# Patient Record
Sex: FEMALE | Race: BLACK OR AFRICAN AMERICAN | Employment: FULL TIME | URBAN - METROPOLITAN AREA
[De-identification: names, ages, dates, MRNs, and addresses within clinical notes are randomized per-mention and may not be internally consistent; named-entity substitution may affect disease eponyms.]

---

## 2017-01-18 PROCEDURE — 88175 CYTOPATH C/V AUTO FLUID REDO: CPT | Performed by: INTERNAL MEDICINE

## 2018-03-28 PROBLEM — E55.9 VITAMIN D DEFICIENCY: Status: ACTIVE | Noted: 2018-03-28

## 2018-03-28 PROBLEM — E78.2 MIXED HYPERLIPIDEMIA: Status: ACTIVE | Noted: 2018-03-28

## 2022-02-05 PROBLEM — D25.0 SUBMUCOUS LEIOMYOMA OF UTERUS: Status: ACTIVE | Noted: 2022-02-05

## 2024-04-22 LAB
HEPATITIS B SURFACE ANTIGEN OB: NEGATIVE
HIV RESULT OB: NEGATIVE
RH FACTOR OB: POSITIVE

## 2024-09-06 LAB — HIV RESULT OB: NEGATIVE

## 2024-10-15 LAB — STREP GP B CULT OB: NEGATIVE

## 2024-10-31 ENCOUNTER — ORDERS FOR ADMISSION (OUTPATIENT)
Dept: OBGYN UNIT | Facility: HOSPITAL | Age: 39
End: 2024-10-31

## 2024-10-31 ENCOUNTER — TELEPHONE (OUTPATIENT)
Dept: OBGYN UNIT | Facility: HOSPITAL | Age: 39
End: 2024-10-31

## 2024-10-31 VITALS — HEIGHT: 66 IN | BODY MASS INDEX: 47.09 KG/M2 | WEIGHT: 293 LBS

## 2024-10-31 RX ORDER — ACETAMINOPHEN 500 MG
500 TABLET ORAL EVERY 6 HOURS PRN
Status: CANCELLED | OUTPATIENT
Start: 2024-10-31

## 2024-10-31 RX ORDER — ACETAMINOPHEN 500 MG
1000 TABLET ORAL EVERY 6 HOURS PRN
Status: CANCELLED | OUTPATIENT
Start: 2024-10-31

## 2024-10-31 RX ORDER — SODIUM CHLORIDE, SODIUM LACTATE, POTASSIUM CHLORIDE, CALCIUM CHLORIDE 600; 310; 30; 20 MG/100ML; MG/100ML; MG/100ML; MG/100ML
INJECTION, SOLUTION INTRAVENOUS AS NEEDED
Status: CANCELLED | OUTPATIENT
Start: 2024-10-31

## 2024-10-31 RX ORDER — ONDANSETRON 2 MG/ML
4 INJECTION INTRAMUSCULAR; INTRAVENOUS EVERY 6 HOURS PRN
Status: CANCELLED | OUTPATIENT
Start: 2024-10-31

## 2024-10-31 RX ORDER — CITRIC ACID/SODIUM CITRATE 334-500MG
30 SOLUTION, ORAL ORAL AS NEEDED
Status: CANCELLED | OUTPATIENT
Start: 2024-10-31

## 2024-10-31 RX ORDER — TERBUTALINE SULFATE 1 MG/ML
0.25 INJECTION, SOLUTION SUBCUTANEOUS AS NEEDED
Status: CANCELLED | OUTPATIENT
Start: 2024-10-31

## 2024-10-31 RX ORDER — NIFEDIPINE 90 MG/1
90 TABLET, FILM COATED, EXTENDED RELEASE ORAL DAILY
COMMUNITY

## 2024-10-31 RX ORDER — ASPIRIN 81 MG/1
81 TABLET, CHEWABLE ORAL DAILY
COMMUNITY
End: 2024-11-02

## 2024-10-31 RX ORDER — CHOLECALCIFEROL (VITAMIN D3) 25 MCG
1 TABLET,CHEWABLE ORAL DAILY
COMMUNITY

## 2024-10-31 RX ORDER — LIDOCAINE HYDROCHLORIDE 10 MG/ML
30 INJECTION, SOLUTION EPIDURAL; INFILTRATION; INTRACAUDAL; PERINEURAL ONCE
Status: CANCELLED | OUTPATIENT
Start: 2024-10-31 | End: 2024-10-31

## 2024-10-31 RX ORDER — LABETALOL 200 MG/1
200 TABLET, FILM COATED ORAL 2 TIMES DAILY
COMMUNITY

## 2024-10-31 RX ORDER — IBUPROFEN 600 MG/1
600 TABLET, FILM COATED ORAL ONCE AS NEEDED
Status: CANCELLED | OUTPATIENT
Start: 2024-10-31

## 2024-10-31 RX ORDER — DEXTROSE, SODIUM CHLORIDE, SODIUM LACTATE, POTASSIUM CHLORIDE, AND CALCIUM CHLORIDE 5; .6; .31; .03; .02 G/100ML; G/100ML; G/100ML; G/100ML; G/100ML
INJECTION, SOLUTION INTRAVENOUS CONTINUOUS
Status: CANCELLED | OUTPATIENT
Start: 2024-10-31

## 2024-11-01 ENCOUNTER — HOSPITAL ENCOUNTER (INPATIENT)
Facility: HOSPITAL | Age: 39
LOS: 1 days | Discharge: HOME | End: 2024-11-02
Attending: OBSTETRICS & GYNECOLOGY | Admitting: OBSTETRICS & GYNECOLOGY
Payer: COMMERCIAL

## 2024-11-01 ENCOUNTER — ANESTHESIA EVENT (OUTPATIENT)
Dept: OBGYN UNIT | Facility: HOSPITAL | Age: 39
End: 2024-11-01
Payer: COMMERCIAL

## 2024-11-01 ENCOUNTER — ANESTHESIA (OUTPATIENT)
Dept: OBGYN UNIT | Facility: HOSPITAL | Age: 39
End: 2024-11-01
Payer: COMMERCIAL

## 2024-11-01 ENCOUNTER — APPOINTMENT (OUTPATIENT)
Dept: OBGYN CLINIC | Facility: HOSPITAL | Age: 39
End: 2024-11-01
Attending: OBSTETRICS & GYNECOLOGY
Payer: COMMERCIAL

## 2024-11-01 PROBLEM — Z34.90 PREGNANCY (HCC): Status: ACTIVE | Noted: 2024-11-01

## 2024-11-01 LAB
ANTIBODY SCREEN: NEGATIVE
BASOPHILS # BLD AUTO: 0.03 X10(3) UL (ref 0–0.2)
BASOPHILS NFR BLD AUTO: 0.3 %
DEPRECATED RDW RBC AUTO: 44.3 FL (ref 35.1–46.3)
EOSINOPHIL # BLD AUTO: 0.06 X10(3) UL (ref 0–0.7)
EOSINOPHIL NFR BLD AUTO: 0.6 %
ERYTHROCYTE [DISTWIDTH] IN BLOOD BY AUTOMATED COUNT: 15.7 % (ref 11–15)
HCT VFR BLD AUTO: 35.1 %
HGB BLD-MCNC: 11.9 G/DL
IMM GRANULOCYTES # BLD AUTO: 0.05 X10(3) UL (ref 0–1)
IMM GRANULOCYTES NFR BLD: 0.5 %
LYMPHOCYTES # BLD AUTO: 2.98 X10(3) UL (ref 1–4)
LYMPHOCYTES NFR BLD AUTO: 28.5 %
MCH RBC QN AUTO: 26.7 PG (ref 26–34)
MCHC RBC AUTO-ENTMCNC: 33.9 G/DL (ref 31–37)
MCV RBC AUTO: 78.9 FL
MONOCYTES # BLD AUTO: 1.26 X10(3) UL (ref 0.1–1)
MONOCYTES NFR BLD AUTO: 12.1 %
NEUTROPHILS # BLD AUTO: 6.06 X10 (3) UL (ref 1.5–7.7)
NEUTROPHILS # BLD AUTO: 6.06 X10(3) UL (ref 1.5–7.7)
NEUTROPHILS NFR BLD AUTO: 58 %
PLATELET # BLD AUTO: 233 10(3)UL (ref 150–450)
RBC # BLD AUTO: 4.45 X10(6)UL
RH BLOOD TYPE: POSITIVE
RH BLOOD TYPE: POSITIVE
T PALLIDUM AB SER QL IA: NONREACTIVE
WBC # BLD AUTO: 10.4 X10(3) UL (ref 4–11)

## 2024-11-01 PROCEDURE — 86780 TREPONEMA PALLIDUM: CPT | Performed by: OBSTETRICS & GYNECOLOGY

## 2024-11-01 PROCEDURE — 0UJD7ZZ INSPECTION OF UTERUS AND CERVIX, VIA NATURAL OR ARTIFICIAL OPENING: ICD-10-PCS | Performed by: OBSTETRICS & GYNECOLOGY

## 2024-11-01 PROCEDURE — 10907ZC DRAINAGE OF AMNIOTIC FLUID, THERAPEUTIC FROM PRODUCTS OF CONCEPTION, VIA NATURAL OR ARTIFICIAL OPENING: ICD-10-PCS | Performed by: OBSTETRICS & GYNECOLOGY

## 2024-11-01 PROCEDURE — 86850 RBC ANTIBODY SCREEN: CPT | Performed by: OBSTETRICS & GYNECOLOGY

## 2024-11-01 PROCEDURE — 3E033VJ INTRODUCTION OF OTHER HORMONE INTO PERIPHERAL VEIN, PERCUTANEOUS APPROACH: ICD-10-PCS | Performed by: OBSTETRICS & GYNECOLOGY

## 2024-11-01 PROCEDURE — 85025 COMPLETE CBC W/AUTO DIFF WBC: CPT | Performed by: OBSTETRICS & GYNECOLOGY

## 2024-11-01 PROCEDURE — 86901 BLOOD TYPING SEROLOGIC RH(D): CPT | Performed by: OBSTETRICS & GYNECOLOGY

## 2024-11-01 PROCEDURE — 86900 BLOOD TYPING SEROLOGIC ABO: CPT | Performed by: OBSTETRICS & GYNECOLOGY

## 2024-11-01 RX ORDER — TERBUTALINE SULFATE 1 MG/ML
0.25 INJECTION, SOLUTION SUBCUTANEOUS AS NEEDED
Status: DISCONTINUED | OUTPATIENT
Start: 2024-11-01 | End: 2024-11-01 | Stop reason: HOSPADM

## 2024-11-01 RX ORDER — BUPIVACAINE HYDROCHLORIDE 2.5 MG/ML
INJECTION, SOLUTION EPIDURAL; INFILTRATION; INTRACAUDAL
Status: COMPLETED | OUTPATIENT
Start: 2024-11-01 | End: 2024-11-01

## 2024-11-01 RX ORDER — ACETAMINOPHEN 500 MG
500 TABLET ORAL EVERY 6 HOURS PRN
Status: DISCONTINUED | OUTPATIENT
Start: 2024-11-01 | End: 2024-11-01 | Stop reason: HOSPADM

## 2024-11-01 RX ORDER — NIFEDIPINE 30 MG/1
90 TABLET, EXTENDED RELEASE ORAL DAILY
Status: DISCONTINUED | OUTPATIENT
Start: 2024-11-01 | End: 2024-11-01

## 2024-11-01 RX ORDER — NALBUPHINE HYDROCHLORIDE 10 MG/ML
2.5 INJECTION INTRAMUSCULAR; INTRAVENOUS; SUBCUTANEOUS
Status: DISCONTINUED | OUTPATIENT
Start: 2024-11-01 | End: 2024-11-01

## 2024-11-01 RX ORDER — LABETALOL 200 MG/1
200 TABLET, FILM COATED ORAL 2 TIMES DAILY
Status: DISCONTINUED | OUTPATIENT
Start: 2024-11-02 | End: 2024-11-02

## 2024-11-01 RX ORDER — BUPIVACAINE HYDROCHLORIDE 2.5 MG/ML
INJECTION, SOLUTION EPIDURAL; INFILTRATION; INTRACAUDAL
Status: COMPLETED
Start: 2024-11-01 | End: 2024-11-01

## 2024-11-01 RX ORDER — AMMONIA INHALANTS 0.04 G/.3ML
0.3 INHALANT RESPIRATORY (INHALATION) AS NEEDED
Status: DISCONTINUED | OUTPATIENT
Start: 2024-11-01 | End: 2024-11-02

## 2024-11-01 RX ORDER — ACETAMINOPHEN 500 MG
500 TABLET ORAL EVERY 6 HOURS PRN
Status: DISCONTINUED | OUTPATIENT
Start: 2024-11-01 | End: 2024-11-02

## 2024-11-01 RX ORDER — LIDOCAINE HYDROCHLORIDE 10 MG/ML
INJECTION, SOLUTION INFILTRATION; PERINEURAL
Status: COMPLETED | OUTPATIENT
Start: 2024-11-01 | End: 2024-11-01

## 2024-11-01 RX ORDER — NIFEDIPINE 30 MG/1
90 TABLET, EXTENDED RELEASE ORAL DAILY
Status: DISCONTINUED | OUTPATIENT
Start: 2024-11-02 | End: 2024-11-02

## 2024-11-01 RX ORDER — ACETAMINOPHEN 500 MG
1000 TABLET ORAL EVERY 6 HOURS PRN
Status: DISCONTINUED | OUTPATIENT
Start: 2024-11-01 | End: 2024-11-01 | Stop reason: HOSPADM

## 2024-11-01 RX ORDER — LIDOCAINE HYDROCHLORIDE AND EPINEPHRINE 15; 5 MG/ML; UG/ML
INJECTION, SOLUTION EPIDURAL
Status: COMPLETED | OUTPATIENT
Start: 2024-11-01 | End: 2024-11-01

## 2024-11-01 RX ORDER — DEXTROSE, SODIUM CHLORIDE, SODIUM LACTATE, POTASSIUM CHLORIDE, AND CALCIUM CHLORIDE 5; .6; .31; .03; .02 G/100ML; G/100ML; G/100ML; G/100ML; G/100ML
INJECTION, SOLUTION INTRAVENOUS CONTINUOUS
Status: DISCONTINUED | OUTPATIENT
Start: 2024-11-01 | End: 2024-11-01 | Stop reason: HOSPADM

## 2024-11-01 RX ORDER — ACETAMINOPHEN 500 MG
1000 TABLET ORAL EVERY 6 HOURS PRN
Status: DISCONTINUED | OUTPATIENT
Start: 2024-11-01 | End: 2024-11-02

## 2024-11-01 RX ORDER — LIDOCAINE HYDROCHLORIDE 10 MG/ML
30 INJECTION, SOLUTION EPIDURAL; INFILTRATION; INTRACAUDAL; PERINEURAL ONCE
Status: DISCONTINUED | OUTPATIENT
Start: 2024-11-01 | End: 2024-11-01 | Stop reason: HOSPADM

## 2024-11-01 RX ORDER — BUPIVACAINE HCL/0.9 % NACL/PF 0.25 %
5 PLASTIC BAG, INJECTION (ML) EPIDURAL AS NEEDED
Status: DISCONTINUED | OUTPATIENT
Start: 2024-11-01 | End: 2024-11-01

## 2024-11-01 RX ORDER — IBUPROFEN 600 MG/1
600 TABLET, FILM COATED ORAL EVERY 6 HOURS
Status: DISCONTINUED | OUTPATIENT
Start: 2024-11-01 | End: 2024-11-02

## 2024-11-01 RX ORDER — CITRIC ACID/SODIUM CITRATE 334-500MG
30 SOLUTION, ORAL ORAL AS NEEDED
Status: DISCONTINUED | OUTPATIENT
Start: 2024-11-01 | End: 2024-11-01 | Stop reason: HOSPADM

## 2024-11-01 RX ORDER — DOCUSATE SODIUM 100 MG/1
100 CAPSULE, LIQUID FILLED ORAL
Status: DISCONTINUED | OUTPATIENT
Start: 2024-11-01 | End: 2024-11-02

## 2024-11-01 RX ORDER — ONDANSETRON 2 MG/ML
4 INJECTION INTRAMUSCULAR; INTRAVENOUS EVERY 6 HOURS PRN
Status: DISCONTINUED | OUTPATIENT
Start: 2024-11-01 | End: 2024-11-01 | Stop reason: HOSPADM

## 2024-11-01 RX ORDER — IBUPROFEN 600 MG/1
600 TABLET, FILM COATED ORAL ONCE AS NEEDED
Status: DISCONTINUED | OUTPATIENT
Start: 2024-11-01 | End: 2024-11-01 | Stop reason: HOSPADM

## 2024-11-01 RX ORDER — BISACODYL 10 MG
10 SUPPOSITORY, RECTAL RECTAL ONCE AS NEEDED
Status: DISCONTINUED | OUTPATIENT
Start: 2024-11-01 | End: 2024-11-02

## 2024-11-01 RX ORDER — SODIUM CHLORIDE, SODIUM LACTATE, POTASSIUM CHLORIDE, CALCIUM CHLORIDE 600; 310; 30; 20 MG/100ML; MG/100ML; MG/100ML; MG/100ML
INJECTION, SOLUTION INTRAVENOUS AS NEEDED
Status: DISCONTINUED | OUTPATIENT
Start: 2024-11-01 | End: 2024-11-01 | Stop reason: HOSPADM

## 2024-11-01 RX ORDER — SIMETHICONE 80 MG
80 TABLET,CHEWABLE ORAL 3 TIMES DAILY PRN
Status: DISCONTINUED | OUTPATIENT
Start: 2024-11-01 | End: 2024-11-02

## 2024-11-01 RX ORDER — BUPIVACAINE HYDROCHLORIDE 2.5 MG/ML
20 INJECTION, SOLUTION EPIDURAL; INFILTRATION; INTRACAUDAL ONCE
Status: COMPLETED | OUTPATIENT
Start: 2024-11-01 | End: 2024-11-01

## 2024-11-01 RX ORDER — LABETALOL 200 MG/1
200 TABLET, FILM COATED ORAL EVERY 12 HOURS SCHEDULED
Status: DISCONTINUED | OUTPATIENT
Start: 2024-11-01 | End: 2024-11-01

## 2024-11-01 RX ADMIN — LIDOCAINE HYDROCHLORIDE AND EPINEPHRINE 3 ML: 15; 5 INJECTION, SOLUTION EPIDURAL at 10:40:00

## 2024-11-01 RX ADMIN — LIDOCAINE HYDROCHLORIDE 5 ML: 10 INJECTION, SOLUTION INFILTRATION; PERINEURAL at 10:40:00

## 2024-11-01 RX ADMIN — BUPIVACAINE HYDROCHLORIDE 1 ML: 2.5 INJECTION, SOLUTION EPIDURAL; INFILTRATION; INTRACAUDAL at 10:40:00

## 2024-11-01 NOTE — ANESTHESIA POSTPROCEDURE EVALUATION
Patient: Lolis Shaw    Procedure Summary       Date: 11/01/24 Room / Location:     Anesthesia Start: 1131 Anesthesia Stop: 1740    Procedure: LABOR ANALGESIA Diagnosis:     Scheduled Providers:  Anesthesiologist: Ruthy Hdez MD    Anesthesia Type: epidural ASA Status: 2            Anesthesia Type: epidural    Vitals Value Taken Time   /67 11/01/24 1746   Temp  11/01/24 1747   Pulse 95 11/01/24 1746   Resp  11/01/24 1747   SpO2 98 % 11/01/24 1735   Vitals shown include unfiled device data.    EM AN Post Evaluation:   Patient Evaluated in floor  Patient Participation: complete - patient participated  Level of Consciousness: awake  Pain Management: adequate  Airway Patency:patent  Yes    Nausea/Vomiting: none  Cardiovascular Status: acceptable  Respiratory Status: acceptable  Postoperative Hydration acceptable      Ruthy Hdez MD  11/1/2024 5:47 PM

## 2024-11-01 NOTE — PROGRESS NOTES
Pt is a 39 year old female admitted to LDR8/LDR8-A.     Chief Complaint   Patient presents with    Scheduled Induction     CHTN        Pt is  38w1d intra-uterine pregnancy.  History obtained, consents signed. Oriented to room, staff, and plan of care.

## 2024-11-01 NOTE — ANESTHESIA PROCEDURE NOTES
Labor Analgesia    Date/Time: 11/1/2024 10:40 AM    Performed by: Ruthy Hdez MD  Authorized by: Ruthy Hdez MD      General Information and Staff    Start Time:  11/1/2024 10:40 AM  End Time:  11/1/2024 11:00 AM  Anesthesiologist:  Ruthy Hdez MD  Performed by:  Anesthesiologist  Patient Location:  OB  Site Identification: surface landmarks    Reason for Block: labor epidural    Preanesthetic Checklist: patient identified, IV checked, site marked, risks and benefits discussed, monitors and equipment checked, pre-op evaluation, timeout performed, anesthesia consent and sterile technique used      Procedure Details    Patient Position:  Sitting  Prep: ChloraPrep    Monitoring:  Heart rate  Approach:  Midline    Epidural Needle    Injection Technique:  KASSIE air  Needle Type:  Tuohy  Needle Gauge:  18 G  Needle Length:  3.5 in  Needle Insertion Depth:  6  Location:  L2-3    Spinal Needle    Needle Type:  Sprotte tip  Needle Gauge:  27 G    Catheter    Catheter Type:  Multi-orifice  Catheter Size:  20 G  Test Dose:  Negative    Assessment      Additional Comments     CSE 2.5 MG BUP

## 2024-11-01 NOTE — L&D DELIVERY NOTE
Vaginal Delivery Note    Patient complete and uncomfortable with contractions. While preparing to start pushing, fetal head delivered precipitously, straight OA over perineum followed without hesitation or delay by transverse shoulders and remainder of infant body.  Viable female infant. APGARS 9 and 9 at 1 and 5 minutes respectively. Cord cut and clamped at 60 seconds. Cord gases and cord blood collected. Placenta delivered intact with fundal massage. Uterus explored noting adequate tone. No lacerations noted. Patient tolerated procedure well. EBL 100cc.      Atiya Shaw [[id number]]      Labor Events      Rupture date/time: 2024 1121     Rupture type: AROM  Fluid color: Clear       Labor Event Times    Dilation complete date/time: 2024       Hoagland Presentation    Presentation: Vertex  Position: Middle Occiput Anterior       Operative Delivery    Operative Vaginal Delivery: Yes  Forceps attempted?: No  Vacuum extractor attempted?: No                Shoulder Dystocia    Shoulder Dystocia: No       Anesthesia    Method: Epidural              Hoagland Delivery      Head delivery date/time: 2024 17:36:17   Delivery date/time:  24 17:36:17   Delivery type: Normal spontaneous vaginal delivery    Details:     Delivery location: delivery room       Delivery Providers       Delivery personnel:  Provider Role    Baby Nurse    Delivery Nurse             Cord    No data filed       Hoagland Measurements    No data filed       Placenta    Date/time: 2024 1740  Removal: Spontaneous  Appearance: Intact       Apgars    No data filed       Skin to Skin    No data filed       Vaginal Count    No data filed       Lacerations    Episiotomy: None  Perineal lacerations: None

## 2024-11-01 NOTE — ANESTHESIA PREPROCEDURE EVALUATION
Anesthesia PreOp Note    HPI:     Lolis Shaw is a 39 year old female who presents for preoperative consultation requested by: * No surgeons listed *    Date of Surgery: 2024    * No procedures listed *  Indication: * No pre-op diagnosis entered *    Relevant Problems   No relevant active problems       NPO:                         History Review:  Patient Active Problem List    Diagnosis Date Noted    Pregnancy (HCC) 2024    Submucous leiomyoma of uterus 2022    Mixed hyperlipidemia 2018    Vitamin D deficiency 2018    Thalassemia trait, beta 2015       Past Medical History:    AMA (advanced maternal age) multigravida 35+ (HCC)    Essential hypertension    High blood pressure    Obesity    Obesity, unspecified    Submucous leiomyoma of uterus    Thalassemia trait, beta       Past Surgical History:   Procedure Laterality Date          x 3        Prescriptions Prior to Admission[1]  Current Medications and Prescriptions Ordered in Epic[2]    Allergies[3]    Family History   Problem Relation Age of Onset    Ovarian Cancer Maternal Grandmother 50    Heart Disorder Mother 50        cad    Lipids Mother     Hypertension Mother     Obesity Mother     Other (epilepsy) Mother     Other (asthma) Brother      Social History     Socioeconomic History    Marital status:    Occupational History    Occupation:     Tobacco Use    Smoking status: Never    Smokeless tobacco: Never   Vaping Use    Vaping status: Never Used   Substance and Sexual Activity    Alcohol use: Yes     Alcohol/week: 0.0 standard drinks of alcohol     Comment: rare     Drug use: No       Available pre-op labs reviewed.  Lab Results   Component Value Date    WBC 10.4 2024    RBC 4.45 2024    HGB 11.9 (L) 2024    HCT 35.1 2024    MCV 78.9 (L) 2024    MCH 26.7 2024    MCHC 33.9 2024    RDW 15.7 (H) 2024    .0 2024             Vital  Signs:  Body mass index is 47.45 kg/m².   weight is 133.4 kg (294 lb). Her oral temperature is 97.7 °F (36.5 °C). Her blood pressure is 132/72 and her pulse is 80. Her respiration is 16 and oxygen saturation is 99%.   Vitals:    11/01/24 1112 11/01/24 1117 11/01/24 1121 11/01/24 1125   BP: 126/70 132/72     Pulse: 70 71 80    Resp:       Temp:    97.7 °F (36.5 °C)   TempSrc:    Oral   SpO2:   99%    Weight:            Anesthesia Evaluation     Patient summary reviewed and Nursing notes reviewed    No history of anesthetic complications   Airway   Mallampati: I  TM distance: >3 FB  Neck ROM: full  Dental      Pulmonary - negative ROS and normal exam   Cardiovascular - normal exam  (+) hypertension    Neuro/Psych - negative ROS     GI/Hepatic/Renal - negative ROS     Endo/Other    Abdominal                  Anesthesia Plan:   ASA:  2  Plan:   Epidural  Informed Consent Plan and Risks Discussed With:  Patient      I have informed Lolis Shaw and/or legal guardian or family member of the nature of the anesthetic plan, benefits, risks including possible dental damage if relevant, major complications, and any alternative forms of anesthetic management.   All of the patient's questions were answered to the best of my ability. The patient desires the anesthetic management as planned.  Ruthy Hdez MD  11/1/2024 11:31 AM  Present on Admission:  **None**           [1]   Medications Prior to Admission   Medication Sig Dispense Refill Last Dose/Taking    prenatal vitamin with DHA 27-0.8-228 MG Oral Cap Take 1 capsule by mouth daily.   10/31/2024 Bedtime    aspirin 81 MG Oral Chew Tab Chew 1 tablet (81 mg total) by mouth daily.   10/31/2024 Morning    labetalol 200 MG Oral Tab Take 1 tablet (200 mg total) by mouth 2 (two) times daily.   11/1/2024 at 12:00 AM    NIFEdipine ER 90 MG Oral Tablet 24 Hr Take 1 tablet (90 mg total) by mouth daily.   10/31/2024 Morning    Phentermine HCl 15 MG Oral Cap Take 1 capsule (15 mg  total) by mouth every morning. 30 capsule 0     Norethin Ace-Eth Estrad-FE (JUNEL FE 1/20) 1-20 MG-MCG Oral Tab Take 1 tablet by mouth daily. 84 tablet 3     Phentermine HCl 37.5 MG Oral Tab Take 1 tablet (37.5 mg total) by mouth every morning before breakfast. 30 tablet 0     Phendimetrazine Tartrate 35 MG Oral Tab Take 1 tab at 4 pm 30 tablet 0     metFORMIN HCl  MG Oral Tablet 24 Hr Take 1 tablet (750 mg total) by mouth daily. 90 tablet 3     Canagliflozin (INVOKANA) 100 MG Oral Tab TAKE 1 TABLET BY MOUTH EVERY DAY 90 tablet 3     topiramate 100 MG Oral Tab Take 1 tablet (100 mg total) by mouth 2 (two) times daily. 180 tablet 3    [2]   Current Facility-Administered Medications Ordered in Epic   Medication Dose Route Frequency Provider Last Rate Last Admin    dextrose in lactated ringers 5% infusion   Intravenous Continuous Suha Gama  mL/hr at 11/01/24 0700 New Bag at 11/01/24 0700    lactated ringers infusion   Intravenous PRN Suha Gama MD        lactated ringers IV bolus 500 mL  500 mL Intravenous PRN Suha Gama MD        acetaminophen (Tylenol Extra Strength) tab 500 mg  500 mg Oral Q6H PRN Suha Gama MD        acetaminophen (Tylenol Extra Strength) tab 1,000 mg  1,000 mg Oral Q6H PRN Suha Gama MD        ibuprofen (Motrin) tab 600 mg  600 mg Oral Once PRN Suha Gama MD        ondansetron (Zofran) 4 MG/2ML injection 4 mg  4 mg Intravenous Q6H PRN Suha Gama MD        oxyTOCIN in sodium chloride 0.9% (Pitocin) 30 Units/500mL infusion premix  62.5-900 jeremy-units/min Intravenous Continuous Suha Gama MD        terbutaline (Brethine) 1 MG/ML injection 0.25 mg  0.25 mg Subcutaneous PRN Suha Gama MD        sodium citrate-citric acid (Bicitra) 500-334 MG/5ML oral solution 30 mL  30 mL Oral PRN Suha Gama MD        lidocaine PF (Xylocaine-MPF) 1% injection  30 mL Intradermal Once Suha Gama MD        oxyTOCIN in sodium chloride 0.9%  (Pitocin) 30 Units/500mL infusion premix  0.5-20 jeremy-units/min Intravenous Continuous Suha Gama MD 4 mL/hr at 11/01/24 1107 4 jeremy-units/min at 11/01/24 1107    NIFEdipine ER (Procardia-XL) 24 hr tab 90 mg  90 mg Oral Daily Suha Ash MD   90 mg at 11/01/24 0657    labetalol (Normodyne) tab 200 mg  200 mg Oral 2 times per day Suha Ash MD   200 mg at 11/01/24 0920    fentaNYL-bupivacaine 2 mcg/mL-0.125% in sodium chloride 0.9% 100 mL EPIDURAL infusion premix   Epidural Continuous Adele Gunter DO        fentaNYL (Sublimaze) 50 mcg/mL injection 100 mcg  100 mcg Epidural Once Adele Gunter DO        bupivacaine PF (Marcaine) 0.25% injection  20 mL Epidural Once Adele Gunetr DO        EPHEDrine (PF) 25 MG/5 ML injection 5 mg  5 mg Intravenous PRN Adele Gunter DO        nalbuphine (Nubain) 10 mg/mL injection 2.5 mg  2.5 mg Intravenous Q15 Min PRN Adele Gunter DO         No current Epic-ordered outpatient medications on file.   [3]   Allergies  Allergen Reactions    Nickel RASH

## 2024-11-01 NOTE — PROGRESS NOTES
[facility]  part of [facility]    Labor Progress Note    Lolis Shaw Patient Status:  Inpatient    1985 MRN [id number]   Location [facility] Attending Suha Ash MD   Hosp Day # 0 PCP Isi Roberto MD     Subjective:   Interval History:   Feeling some contractions.     Objective:   Vital signs in last 24 hours:  Temp:  [97.5 °F (36.4 °C)-98.2 °F (36.8 °C)] 97.5 °F (36.4 °C)  Pulse:  [70-92] 80  Resp:  [16] 16  BP: (105-153)/(68-89) 117/79  SpO2:  [99 %-100 %] 100 %    Input/Output:    Intake/Output Summary (Last 24 hours) at 2024 1418  Last data filed at 2024 1150  Gross per 24 hour   Intake --   Output 150 ml   Net -150 ml       Fetal Surveillance:  Fetal heart tones: baseline 115-120  Uterine contractions: adequate    Sterile vaginal exam:  Dilation: 6 cm dilation   Effacement:  90%    Station: -3   Position: Cephalic  Presentation: vertex    Results:   Lab Results   Component Value Date    TREPONEMALAB Nonreactive 2024    RAPIDHIVSCRN Negative 2024    ABO O 2024    RH Positive 2024    WBC 10.4 2024    HGB 11.9 (L) 2024    HCT 35.1 2024    .0 2024    CREATSERUM 0.75 2022    BUN 7.0 2022     2022    K 4.03 2022     2022    CO2 23.0 2022    GLU 91 2022    CA 9.4 2022    ALB 4.1 2022    ALKPHO 70 2022    BILT 0.27 2022    TP 6.4 2022    AST 16 2022    ALT 15 2022    TSH 0.841 11/15/2021       Lab Results   Component Value Date    RPR Non Reactive 10/03/2009    B12 554 10/21/2021    SPECGRAVITY      >=1.030 (A) 2022    NITRITE Negative 2022       Assessment & Plan:     Pregnancy (HCC)  Bradycardia with side positioning.  Patient knee/chest with recovery of fetal heart tones.      Plan discussed with patient who verbalizes understanding and agreement.    Suha Gama,  MD  11/1/2024

## 2024-11-01 NOTE — H&P
[facility]  part of [facility]    History & Physical    Lolis Shaw Patient Status:  Inpatient    1985 MRN [id number]   Location [facility] Attending Suha Ash MD   Hosp Day # 0 PCP Isi Roberto MD     Date of Admission:  2024    SUBJECTIVE:    Lolis Shaw is a 39 year old  female with 2024, by Last Menstrual Period   at 38w1d gestation who is being admitted for induction of labor.  Her current obstetrical history is significant for chronic hypertension, advanced maternal age, obesity.  Patient reports no complaints .   Fetal Movement: normal.     Obstetric History:   OB History    Para Term  AB Living   4 3 3 0 0 3   SAB IAB Ectopic Multiple Live Births   0 0 0   3      # Outcome Date GA Lbr Devon/2nd Weight Sex Type Anes PTL Lv   4 Current            3 Term 12    M NORMAL SPONT   PABLO   2 Term 09 40w0d 02:00 7 lb 13 oz (3.544 kg) M Vag-Spont EPI N PABLO      Birth Comments: no complication   1 Term 07 40w0d 04:00 6 lb 9 oz (2.977 kg) M Vag-Spont EPI N PABLO      Birth Comments: no complicaation     Past Medical History:   Past Medical History:    AMA (advanced maternal age) multigravida 35+ (HCC)    Essential hypertension    High blood pressure    Obesity    Obesity, unspecified    Submucous leiomyoma of uterus    Thalassemia trait, beta     Past Social History:   Past Surgical History:   Procedure Laterality Date          x 3      Family History:   Family History   Problem Relation Age of Onset    Ovarian Cancer Maternal Grandmother 50    Heart Disorder Mother 50        cad    Lipids Mother     Hypertension Mother     Obesity Mother     Other (epilepsy) Mother     Other (asthma) Brother      Social History:   Social History     Tobacco Use    Smoking status: Never    Smokeless tobacco: Never   Substance Use Topics    Alcohol use: Yes     Alcohol/week: 0.0 standard drinks of alcohol      Comment: rare        Home Meds: Prescriptions Prior to Admission[1]  Allergies: Allergies[2]    OBJECTIVE:    Pulse:  [85-92] 85  BP: (144-153)/(87-89) 144/89    Lungs:   clear to auscultation bilaterally   Heart:   regular rate and rhythm, S1, S2 normal, no murmur, click, rub or gallop   Abdomen: FHT present   Fetal Surveillance:  140 BPM   Fetal heart variability: moderate      Cervix: 3-4/60/-3 per RN     Lab Review:  O, Rh+, Rubella-immune, Hepatitis B surface antigen non-reactive, GBS negative  Lab Results   Component Value Date    WBC 10.4 11/01/2024    HGB 11.9 11/01/2024    HCT 35.1 11/01/2024    .0 11/01/2024          ASSESSMENT:    38w1d weeks gestation.  Not in labor.  Obstetrical history significant for chronic hypertension, advanced maternal age, obesity.    PLAN:    Risks, benefits, alternatives and possible complications have been discussed in detail with the patient.  Pre-admission, admission, and post admission procedures and expectations were discussed in detail.  All questions answered, all appropriate consents will be signed at the Hospital. Admission is planned for today.   Anticipate vaginal delivery., Augmentation: IV Pitocin induction., and Analgesia: ABDIAS.    Suha Gama MD  11/1/2024  7:20 AM         [1]   Medications Prior to Admission   Medication Sig Dispense Refill Last Dose/Taking    prenatal vitamin with DHA 27-0.8-228 MG Oral Cap Take 1 capsule by mouth daily.   10/31/2024 Bedtime    aspirin 81 MG Oral Chew Tab Chew 1 tablet (81 mg total) by mouth daily.   10/31/2024 Morning    labetalol 200 MG Oral Tab Take 1 tablet (200 mg total) by mouth 2 (two) times daily.   11/1/2024 at 12:00 AM    NIFEdipine ER 90 MG Oral Tablet 24 Hr Take 1 tablet (90 mg total) by mouth daily.   10/31/2024 Morning    Phentermine HCl 15 MG Oral Cap Take 1 capsule (15 mg total) by mouth every morning. 30 capsule 0     Norethin Ace-Eth Estrad-FE (JUNEL FE 1/20) 1-20 MG-MCG Oral Tab Take 1 tablet by  mouth daily. 84 tablet 3     Phentermine HCl 37.5 MG Oral Tab Take 1 tablet (37.5 mg total) by mouth every morning before breakfast. 30 tablet 0     Phendimetrazine Tartrate 35 MG Oral Tab Take 1 tab at 4 pm 30 tablet 0     metFORMIN HCl  MG Oral Tablet 24 Hr Take 1 tablet (750 mg total) by mouth daily. 90 tablet 3     Canagliflozin (INVOKANA) 100 MG Oral Tab TAKE 1 TABLET BY MOUTH EVERY DAY 90 tablet 3     topiramate 100 MG Oral Tab Take 1 tablet (100 mg total) by mouth 2 (two) times daily. 180 tablet 3    [2]   Allergies  Allergen Reactions    Nickel RASH

## 2024-11-01 NOTE — PROGRESS NOTES
[facility]  part of [facility]    Labor Progress Note    Lolis Shaw Patient Status:  Inpatient    1985 MRN [id number]   Location [facility] Attending Suha Ash MD   Hosp Day # 0 PCP Isi Roberto MD     Subjective:   Interval History:   More comfortable with epidural    Objective:   Vital signs in last 24 hours:  Temp:  [97.7 °F (36.5 °C)-98.2 °F (36.8 °C)] 98.2 °F (36.8 °C)  Pulse:  [70-92] 70  Resp:  [16] 16  BP: (110-153)/(68-89) 126/70  SpO2:  [99 %] 99 %    Input/Output:  No intake or output data in the 24 hours ending 24 1124    Fetal Surveillance:  Fetal heart tones: variability  Uterine contractions:  2-4 minutes apart    Sterile vaginal exam:  Dilation: 4 cm dilation   Effacement: 50%   Station: -3   Position: Cephalic  Presentation: vertex    Results:   Lab Results   Component Value Date    TREPONEMALAB Nonreactive 2024    RAPIDHIVSCRN Negative 2024    ABO O 2024    RH Positive 2024    WBC 10.4 2024    HGB 11.9 (L) 2024    HCT 35.1 2024    .0 2024    CREATSERUM 0.75 2022    BUN 7.0 2022     2022    K 4.03 2022     2022    CO2 23.0 2022    GLU 91 2022    CA 9.4 2022    ALB 4.1 2022    ALKPHO 70 2022    BILT 0.27 2022    TP 6.4 2022    AST 16 2022    ALT 15 2022    TSH 0.841 11/15/2021       Lab Results   Component Value Date    RPR Non Reactive 10/03/2009    B12 554 10/21/2021    SPECGRAVITY      >=1.030 (A) 2022    NITRITE Negative 2022       Assessment & Plan:     Pregnancy (HCC)      AROM clear fluid  CPM      Plan discussed with patient who verbalizes understanding and agreement.    Suha Gama MD  2024     VITAL SIGNS-Telemetry:    Vital Signs Last 24 Hrs  T(C): 36.6 (18 @ 05:42), Max: 36.8 (18 @ 17:55)  T(F): 97.9 (18 @ 05:42), Max: 98.2 (18 @ 17:55)  HR: 90 (18 @ 05:42) (89 - 97)  BP: 108/78 (18 @ 05:42) (93/73 - 118/85)  RR: 18 (18 @ 05:42) (18 - 18)  SpO2: 98% (18 @ 05:42) (97% - 100%)          @ 07:01  -   @ 07:00  --------------------------------------------------------  IN: 1580 mL / OUT: 1500 mL / NET: 80 mL     @ 07:01  -   @ 09:41  --------------------------------------------------------  IN: 360 mL / OUT: 350 mL / NET: 10 mL        Daily     Daily Weight in k.9 (2018 08:16)    Coumadin    [ ] YES          [ x ]      NO         Reason:     on hold d/t gib  PHYSICAL EXAM:  Neurology: alert and oriented x 3, nonfocal, no gross deficits  CV : + LVAD hum  Sternal Wound :  CDI , Stable  Lungs: CTA  Abdomen: soft, nontender, nondistended, positive bowel sounds, last bowel movement         Extremities:      no edema, no calf tenderness    acetaminophen   Tablet 650 milliGRAM(s) Oral every 6 hours PRN  ascorbic acid 500 milliGRAM(s) Oral two times a day  docusate sodium 100 milliGRAM(s) Oral three times a day  hydrALAZINE 25 milliGRAM(s) Oral every 8 hours  hydrocortisone 2.5% Cream 1 Application(s) Topical two times a day  mexiletine 150 milliGRAM(s) Oral two times a day  pantoprazole  Injectable 40 milliGRAM(s) IV Push daily  polyethylene glycol 3350 17 Gram(s) Oral daily  QUEtiapine 50 milliGRAM(s) Oral at bedtime  senna 2 Tablet(s) Oral at bedtime  sodium chloride 0.9% lock flush 3 milliLiter(s) IV Push every 8 hours    Physical Therapy Rec:   Home  [ x ]   Home w/ PT  [  ]  Rehab  [  ]  Discussed with Cardiothoracic Team at AM rounds.

## 2024-11-02 VITALS
TEMPERATURE: 97 F | DIASTOLIC BLOOD PRESSURE: 84 MMHG | BODY MASS INDEX: 47 KG/M2 | WEIGHT: 293 LBS | RESPIRATION RATE: 16 BRPM | SYSTOLIC BLOOD PRESSURE: 120 MMHG | HEART RATE: 75 BPM | OXYGEN SATURATION: 99 %

## 2024-11-02 LAB
BASOPHILS # BLD AUTO: 0.03 X10(3) UL (ref 0–0.2)
BASOPHILS NFR BLD AUTO: 0.3 %
DEPRECATED RDW RBC AUTO: 44.3 FL (ref 35.1–46.3)
EOSINOPHIL # BLD AUTO: 0.16 X10(3) UL (ref 0–0.7)
EOSINOPHIL NFR BLD AUTO: 1.4 %
ERYTHROCYTE [DISTWIDTH] IN BLOOD BY AUTOMATED COUNT: 15.9 % (ref 11–15)
HCT VFR BLD AUTO: 34.6 %
HGB BLD-MCNC: 11.2 G/DL
IMM GRANULOCYTES # BLD AUTO: 0.04 X10(3) UL (ref 0–1)
IMM GRANULOCYTES NFR BLD: 0.3 %
LYMPHOCYTES # BLD AUTO: 2.98 X10(3) UL (ref 1–4)
LYMPHOCYTES NFR BLD AUTO: 25.9 %
MCH RBC QN AUTO: 25.3 PG (ref 26–34)
MCHC RBC AUTO-ENTMCNC: 32.4 G/DL (ref 31–37)
MCV RBC AUTO: 78.3 FL
MONOCYTES # BLD AUTO: 1.15 X10(3) UL (ref 0.1–1)
MONOCYTES NFR BLD AUTO: 10 %
NEUTROPHILS # BLD AUTO: 7.13 X10 (3) UL (ref 1.5–7.7)
NEUTROPHILS # BLD AUTO: 7.13 X10(3) UL (ref 1.5–7.7)
NEUTROPHILS NFR BLD AUTO: 62.1 %
PLATELET # BLD AUTO: 234 10(3)UL (ref 150–450)
RBC # BLD AUTO: 4.42 X10(6)UL
WBC # BLD AUTO: 11.5 X10(3) UL (ref 4–11)

## 2024-11-02 PROCEDURE — 85025 COMPLETE CBC W/AUTO DIFF WBC: CPT | Performed by: OBSTETRICS & GYNECOLOGY

## 2024-11-02 NOTE — LACTATION NOTE
24 1030   Evaluation Type   Evaluation Type Inpatient   Problems identified   Problems identified Knowledge deficit   Maternal history   Maternal history AMA   Breastfeeding goal   Breastfeeding goal To maintain breast milk feeding per patient goal   Maternal Assessment   Bilateral Breasts Soft;Symmetrical   Bilateral Nipples Colostrum easily expressed;WNL;Elastic   Prior breastfeeding experience (comment below) Multip   Prior BF experience: comment last baby is 12 years old. Breast/bottle for 6 weeks, stopped due to lower supply.   Breastfeeding Assistance Breastfeeding assistance provided with permission;Hand expression provided with permission   Pain assessment   Treatment of Sore Nipples Deeper latch techniques     Reviewed normal  feeding behavior, strategies to promote a robust supply, and deep latching techniques. Observed mom hand express colostrum easily. Assisted mom with latching in a laid back position to her right side. Encouraged mom to follow up post discharge for ongoing breastfeeding support.

## 2024-11-02 NOTE — LACTATION NOTE
This note was copied from a baby's chart.     11/02/24 1030   Evaluation Type   Evaluation Type Inpatient   Problems & Assessment   Muscle tone Appropriate for GA   Feeding Assessment   Summary Current Feeding Breastfeeding exclusively   Breastfeeding Assessment Assisted with breastfeeding w/mother's permission;Calm and ready to breastfeed;Deep latch achieved and observed;Coordinated suck/swallow;Tolerated feeding well;Sustained nutrititive latch w/audible swallows   Latch 2   Audible Sucks/Swallows 2   Type of Nipple 2   Comfort (Breast/Nipple) 1   Hold (Positioning) 1   LATCH Score 8

## 2024-11-02 NOTE — PLAN OF CARE
Problem: BIRTH - VAGINAL/ SECTION  Goal: Fetal and maternal status remain reassuring during the birth process  Description: INTERVENTIONS:  - Monitor vital signs  - Monitor fetal heart rate  - Monitor uterine activity  - Monitor labor progression (vaginal delivery)  - DVT prophylaxis (C/S delivery)  - Surgical antibiotic prophylaxis (C/S delivery)  Outcome: Completed     Problem: PAIN - ADULT  Goal: Verbalizes/displays adequate comfort level or patient's stated pain goal  Description: INTERVENTIONS:  - Encourage pt to monitor pain and request assistance  - Assess pain using appropriate pain scale  - Administer analgesics based on type and severity of pain and evaluate response  - Implement non-pharmacological measures as appropriate and evaluate response  - Consider cultural and social influences on pain and pain management  - Manage/alleviate anxiety  - Utilize distraction and/or relaxation techniques  - Monitor for opioid side effects  - Notify MD/LIP if interventions unsuccessful or patient reports new pain  - Anticipate increased pain with activity and pre-medicate as appropriate  Outcome: Progressing     Problem: ANXIETY  Goal: Will report anxiety at manageable levels  Description: INTERVENTIONS:  - Administer medication as ordered  - Teach and rehearse alternative coping skills  - Provide emotional support with 1:1 interaction with staff  Outcome: Progressing     Problem: Patient Centered Care  Goal: Patient preferences are identified and integrated in the patient's plan of care  Description: Interventions:  - What would you like us to know as we care for you?   - Provide timely, complete, and accurate information to patient/family  - Incorporate patient and family knowledge, values, beliefs, and cultural backgrounds into the planning and delivery of care  - Encourage patient/family to participate in care and decision-making at the level they choose  - Honor patient and family perspectives and  choices  Outcome: Progressing     Problem: Patient/Family Goals  Goal: Patient/Family Long Term Goal  Description: Patient's Long Term Goal:    Interventions:    - See additional Care Plan goals for specific interventions  Outcome: Progressing  Note: Patient's Long Term Goal: Uncomplicated Delivery     Interventions:  - Assessment/Monitoring  - Induction/Augmentation per protocol and Provider order  - C/S per protocol and Provider order   - Education  - Intervention per protocol and Provider order with education   - Involve patient in POC  - See additional Care Plan goals for specific interventions    Goal: Patient/Family Short Term Goal  Description: Patient's Short Term Goal:    Interventions:     - See additional Care Plan goals for specific interventions  Outcome: Progressing  Note: Patient's Short Term Goal: Comfort and Pain Control     Interventions:   - Non Pharmacological pain intervention   - IV/IM and Epidural pain medication per Provider order and patient request  - Education  - Involve Patient in POC   - See additional Care Plan goals for specific interventions

## 2024-11-02 NOTE — PROGRESS NOTES
Postpartum Progress Note - Vaginal Delivery    Assessment/Plan    Postpartum Day #1 from Vaginal Delivery: Doing well    - Pregnancy complicated by CHTN, AMA, obesity  - Labetalol 200mg BID and Procardia 30mg XL daily continued. Blood pressures have been mostly normotensive with mild ranges noted around the time of delivery.  - Continue routine postpartum care.  - CBC pending this AM  - Anticipate discharge home today or tomorrow pending blood pressure monitoring. Patient aware to scheduled 1 week blood pressure check and 6 week postpartum visit. Reviewed continued home blood pressure monitoring and parameters.    Subjective:  The patient feels well. Pain is well controlled with current medications. Vaginal bleeding is appropriate. The patient is ambulating well. The patient is tolerating a normal diet. The baby is well.    Objective:  Vital signs in last 24 hours:  Temp:  [97.4 °F (36.3 °C)-99.1 °F (37.3 °C)] 99.1 °F (37.3 °C)  Pulse:  [67-95] 70  Resp:  [16] 16  BP: (105-157)/(59-96) 135/78  SpO2:  [99 %-100 %] 99 %    General: Alert and oriented, no distress  Lochia: Appropriate  Uterine Fundus: Firm, nontender  DVT Evaluation: No evidence of DVT

## 2024-11-02 NOTE — DISCHARGE SUMMARY
Discharge Summary - Vaginal Delivery    Admission Date: 11/1/2024  Discharge Date: 11/2/2024    Discharge Summary: Lolis Shaw is a 39 year old who presented on 11/1/2024 for induction of labor for CHTN. She progressed through labor and underwent a normal vaginal delivery. Please see the Delivery Note for complete details. Postpartum, patient did well. She was discharged home postpartum day #1 in stable condition. At the time of discharge, her pain was well controled, vaginal bleeding was appropriate, she was tolerating a regular diet, and ambulating without difficulty. She was discharged home with instructions for follow-up.

## 2024-11-02 NOTE — PROGRESS NOTES
Patient up to bathroom with assist x 2.  Voided multiple times >150ml, see flow chart for details. Patient transferred to mother/baby room 351 per wheelchair in stable condition with baby and personal belongings.  Accompanied by significant other and staff.

## 2024-11-03 NOTE — PLAN OF CARE
[facility]                                                          Alvin Tao MD      COLONOSCOPY INSTRUCTIONS - MORNING PROCEDURE GATORADE PREP    NAME: [facility]: [facility]   DATE: 5/19/22    CHECK IN: 3rd floor GI     ARRIVAL TIME: 7:30 am    TIME OF EXAM: 8:30 am      NEEDS COVID TEST- 2-3 days prior at an [facility], needs appt scheduled    · A sedative will be given to you for the exam.  A responsible adult 18 years of age or older must accompany you from the hospital the day of your exam.  You may not leave by yourself or drive yourself home.  You may not drive for the rest of the day or return to work.  · IF YOU DO NOT HAVE A  THAT IS A RESPONSIBLE ADULT THAT IS 18 YEARS OF AGE OR OLDER, YOUR APPOINTMENT WILL BE CANCELLED.  · Take no aspirin, Magda Newton or arthritis medicine for five days prior to your exam.  You may take Tylenol  (acetaminophen).  · If you take blood thinners (aspirin, Coumadin, Warfarin or Plavix) or if you have had a heart valve replacement, a pacemaker, defibrillator, or kidney problems, please call our office to make special arrangements with our colonoscopy specialists regarding your procedure.  · If you are diabetic, you need to check with your primary doctor regarding insulin adjustments, changes in diabetic pills you should take or dietary alternatives.  · Prior to your procedure, preadmissions will call. You may be asked to return a phone call to this department to verify your current health history and/or medications.   · You should expect to be there approximately 2 to 2.5 hours. You will receive written information regarding the results of your colonoscopy.     WHAT YOU NEED TO PURCHASE:  · Purchase 3 Dulcolax 5mg tablets. (available without a prescription)  · Purchase one bottle of MiraLax (238gm) over the counter.  · Purchase one 2 liter bottle of Gatorade (64 ounces), any flavor except a red or purple colored one.    TWO    Problem: PAIN - ADULT  Goal: Verbalizes/displays adequate comfort level or patient's stated pain goal  Description: INTERVENTIONS:  - Encourage pt to monitor pain and request assistance  - Assess pain using appropriate pain scale  - Administer analgesics based on type and severity of pain and evaluate response  - Implement non-pharmacological measures as appropriate and evaluate response  - Consider cultural and social influences on pain and pain management  - Manage/alleviate anxiety  - Utilize distraction and/or relaxation techniques  - Monitor for opioid side effects  - Notify MD/LIP if interventions unsuccessful or patient reports new pain  - Anticipate increased pain with activity and pre-medicate as appropriate  Outcome: Adequate for Discharge      DAYS BEFORE EXAMINATION: 5/16/22  · Follow a LOW RESIDUE DIET     DAY BEFORE EXAMINATION: 5/17/22  • Mix the whole bottle of MiraLax with the whole bottle of Gatorade and refrigerate.  • Do not eat any solid food all day.    • You may drink clear liquids, such as soda, clear fruit juice, coffee or tea without cream, beef or chicken broth, Popsicles, or Jell-O.  Avoid anything with RED OR PURPLE coloring.  We encourage you to drink a lot of fluids for couple of days prior to procedure.  • At 3 p.m. Take the three Dulcolax tablets with 8-12 oz of clear liquid, even if the box tells you to only take two tablets.   • At 4 p.m., begin drinking the Gatorade mixture. Drink an 8 oz glass every 10 to 15 minutes. It is best to drink the whole glass rapidly rather than sipping small amounts.  • Continue drinking until the bottle is empty.  It usually takes 1 to 2 hours to completely drink the bottle, so give yourself plenty of time.  • You may drink clear liquids or suck on hard candy or Popsicles while drinking the Prep.  • Some people may feel full or sick after starting to drink the solution.  This disappears with time, especially when you start passing stool.  If you feel sick, stop drinking for about 30 minutes and see if you can pass some stool.  The problem should resolve and you should be able to start drinking again.  If you still have problems, call us for instructions.    DAY OF EXAMINATION: 5/18/22  • Do not eat or drink anything after midnight the night before your exam.  • Take your regular blood pressure and heart medications on the morning of the test with a sip of water.    IF YOU HAVE ANY QUESTIONS REGARDING THESE INSTRUCTIONS PLEASE CALL [phone]          Low-Residue Diet For Colon Prep    THigh-? april or high-residue foods need to be limited. Fruits and vegetables are allowed in small amounts, but should be cooked well. Milk does not contain ? april, but it does leave some residue in your colon after it  is digested. Milk and milk products must be limited to 2 cups per day. This diet contains a variety of foods.           Food Group Foods Allowed Food to Avoid       Beverages Decaffeinated or regular coffee, tea, milk   (fat-free, 1%, 2% or whole), caffeine-free   or regular soda    Limit milk to 2 cups a day, including   milk used for cooking.         Milk or dairy (more than 2 cups a day)       Bread White, light rye, ?teetee milled wheat bread   or rolls, saltines, plain crackers, zwieback,   ?our tortillas Breads, rolls or crackers with whole   wheat, whole grain, bran, seeds or nuts;   date, raisin, nut or pumpernickel bread;   nicolette crackers         Cereals Re?amilcar cooked cereals, such as farina,   cream of wheat, cream of rice, oatmeal   or grits; re?amilcar dry cereals, such as corn   ?akes, puffed wheat or rice, Rice Krispies   or Rice Chex       Whole-grain cereals; steel-cut oats, dry   wheat, bran, oat, barley or granola cereals           Condiments   Plain candy, sugar, honey, clear jelly, syrup;   ketchup, mustard             Jams, preserves, marmalades         Fats   Use sparingly: margarine, butter, sour   cream, mayonnaise, vegetable oil,   shortening, cream cheese, crisp vázquez       Spicy salad dressings or those containing seeds   Food Group Foods Allowed Food to Avoid                   Fruits         Any fruit juice (Prune juice may need to be   avoided if diarrhea is a problem.)  Raw banana, avocado, canned grapefruit or   orange sections with no membranes, peeled   or baked apples (no skins), applesauce,   canned fruits, such as apricots, pears,   peaches, fruit cocktail.       Do not eat more than 3 servings per day.               Dried fruits                    Avoid all others.   Protein foods:      Meat/eggs/?sh                  Cheese            Nuts and seeds                 Dried peas and beans       Tender meats trimmed of fat, poultry  Eggs, except fried  Fresh, frozen or canned  ?sh           Cottage cheese, American, Swiss  cheddar, bob, provolone  Use only small amounts as these   are dairy products.      Creamy peanut butter in small amounts              None     Tough, grisly, spiced or cured meats; high-fat meats; meats or ?sh breaded with whole-grain breading or nuts; fried meats;   sausage containing seeds or whole spices    Pepper-mamadou cheese or those with seeds  Avoid all others.        Munger peanut butter  Avoid all others.        All canned, cooked legumes, lentils or   beans, such as baked beans, kidney beans,   garbanzo beans, etc.   Pasta, potatoes  or rice Re?amilcar pasta, noodles  Potatoes without skins, sweet potatoes   without skins; white rice Whole-grain pasta or noodles  Fried potatoes  Brown or wild rice   Food Group Foods Allowed Food to Avoid       Soups Cream soups made with milk allowance for   the day; soups with allowable vegetables Bean, lentil or split-pea soup, minestrone,   highly seasoned soups, high-fat soups           Vegetables     Well cooked: asparagus, beets, carrots, chard leaves, green or wax beans, pumpkin,   spinach, zucchini, summer or winter squash,   tomato sauce or paste  Vegetable juice  Do not eat more than 2 servings per day       All vegetables not listed   Miscellaneous White sauce made from milk allowance,   meat gravy, cocoa, salt, vinegar, lemon   juice, ground spices and herbs (in   moderation) Olives, pickles, popcorn; fried snack   foods, such as potato chips, corn chips  Cayenne, chili powder, garlic, pepper;   whole spices, such as lorraine seeds         Procedural sedation is a form of relaxation using medicines given through an IV (a small tube placed into a  vein). The medicine will help you to be relaxed and comfortable during the procedure. This is not general  anesthesia - you will not be sound asleep.  What will happen?  When you arrive for your procedure, you will  change into a gown. Your nurse will check  your blood  pressure, pulse, breathing rate and  temperature. Then your nurse or doctor will start  an IV. We will answer any questions you have  and position you comfortably before any sedative  medicines are given.  Once given, the medicines work slowly to make  you feel relaxed. You will feel sleepy but will be  easily awakened when spoken to or touched. You  may or may not remember being in the procedure  room. Since you may be awake at times, please  let the staff know if you need anything or are  having any discomfort. Our goal is to keep you  comfortable during the procedure.  Before your procedure  Do not eat or drink anything for at least 8 hours  before your procedure and sedation. Take your  usual medications with a sip of water, unless your  doctor or nurse tells you otherwise.  • If you have diabetes, please ask your doctor or  nurse if there are special steps you should follow  with your diet, pills or insulin.  • Tell your doctor or nurse if you are or think that  you may be pregnant, or if you are breastfeeding.  • Before your procedure, please tell your doctor  if you have any allergies or take any bloodthinning  medicines.  • You must arrange to have someone take you  home. If possible, have someone stay with you at  home the day of discharge.  After your procedure  • You must have someone take you home.  • Do not drive or operate heavy or potentially  harmful equipment for 24 hours.  • Do not drink any alcoholic beverage for 24 hours.  • Quiet, restful activity is usually recommended.  • Please follow any special instructions that pertain  to your procedure.  • Do not make important decisions for 24 hours.  • Walk with help for as long as you feel drowsy  or dizzy.  • Follow your doctor’s instructions about returning  to work.  • Resume normal diet and medication as told by  your doctor.  Call your doctor if you have any questions or  problems.  [id number] (Rev. 10/10) ©[facility] Sedation  About Your Procedural  Sedation  [email or web address]  The information presented is intended for general information and educational purposes. It is not intended to replace the  advice of your health care provider. Contact your health care provider if you believe you have a health problem.  [facility] is a not-for-profit health care provider and a national leader in efforts to improve the quality of health care.

## 2024-11-19 ENCOUNTER — LACTATION ENCOUNTER (OUTPATIENT)
Dept: LACTATION | Facility: HOSPITAL | Age: 39
End: 2024-11-19

## 2024-11-19 ENCOUNTER — NURSE ONLY (OUTPATIENT)
Dept: LACTATION | Facility: HOSPITAL | Age: 39
End: 2024-11-19
Attending: OBSTETRICS & GYNECOLOGY
Payer: COMMERCIAL

## 2024-11-19 PROCEDURE — 99213 OFFICE O/P EST LOW 20 MIN: CPT

## 2024-11-19 NOTE — LACTATION NOTE
This note was copied from a baby's chart.     11/19/24 8049   Evaluation Type   Evaluation Type Outpatient Initial   Problems & Assessment   Problems: comment/detail Lolis is here with her 18 day old infant for reassurance about her latch and weight gain. Chuck regained birthweight exclusively breastfeeding by 14 days. Mom is concerned that infant is only feeding 6-7 times a day and does not always stay on the breast for more than 5-8 minutes at a time. Infant went 3 days without stooling last week. Mom also feels latch is often shallow.   Infant Assessment Hunger cues present;Skin color: pink or appropriate for ethnicity   Muscle tone Appropriate for GA   Feeding Assessment   Summary Current Feeding Breastfeeding exclusively   Breastfeeding Assessment Assisted with breastfeeding w/mother's permission;Calm and ready to breastfeed;Coordinated suck/swallow;Deep latch achieved and observed;Tolerated feeding well;Sustained nutrititive latch w/audible swallows   Breastfeeding Positions right breast;left breast;football;cross cradle   Latch 2   Audible Sucks/Swallows 2   Type of Nipple 2   Comfort (Breast/Nipple) 2   Hold (Positioning) 1   LATCH Score 9   Other (comment) Observed slightly shallow latch initially. Coached mom on deep latcing techniques and observed infant relatch more deeply with consistent sucks and swallows for 10 minutes on each side.   Output   # Voids in 24 hours 6+   # Stools in 24 hours 1   Pre/Post Weights   Pre-Weight Right Breast (g) 3302   Post-Weight Right Breast (g) 3334   ml of milk, RT Brst 32   Pre-Weight Left Breast (g) 3274   Post-Weight Left Breast (g) 3302   ml of milk, LT Brst 28   ml of milk, total 60     S/B: see note above    Assessment/Recommendation: see above for feeding details. Infant  well overall, transferring a total of 60ml for this feeding. Recommended mom offer feedings more frequently during the day, with the aim of feeding infant at least every 8 hours.  Advised to bring infant to breast instead of using a pacifier, as even short feedings (I.e. \"snacks\") can help promote a robust milk supply for mom and support infant weight gain.      Recommended mom consider pumping once per day to help her promote a robust milk supply and to avoid going more than 4 hours without breast emptying this first month.    Mom will follow up with her pediatrician as directed. Encouraged to call the lactation clinic for ongoing support if needed, but no follow up was scheduled at this time.

## 2024-11-19 NOTE — LACTATION NOTE
11/19/24 7724   Evaluation Type   Evaluation Type Outpatient Initial   Problems identified   Problems identified Knowledge deficit   Problems Identified Other Lolis is here with her 18 day old infant for reassurance about her latch and weight gain. Chuck regained birthweight exclusively breastfeeding by 14 days. Mom is concerned that infant is only feeding 6-7 times a day and does not always stay on the breast for more than 5-8 minutes at a time. Infant went 3 days without stooling last week. Mom also feels latch is often shallow.   Maternal history   Maternal history AMA   Breastfeeding goal   Breastfeeding goal To maintain breast milk feeding per patient goal   Maternal Assessment   Bilateral Breasts Soft;Symmetrical   Bilateral Nipples Colostrum easily expressed;WNL;Elastic   Prior BF experience: comment last baby is 12 years old. Breast/bottle for 6 weeks, stopped due to lower supply.   Breastfeeding Assistance Breastfeeding assistance provided with permission   Pain assessment   Pain, additional Pain w/initial sucks only   Treatment of Sore Nipples Deeper latch techniques   Guidelines for use of:   Breast pump type Other   Current use of pump: Uses Baby buddha pump occasionally at home       S/B: see note above     Assessment/Recommendation: see above for feeding details. Infant  well overall, transferring a total of 60ml for this feeding. Recommended mom offer feedings more frequently during the day, with the aim of feeding infant at least every 8 hours. Advised to bring infant to breast instead of using a pacifier, as even short feedings (I.e. \"snacks\") can help promote a robust milk supply for mom and support infant weight gain.       Recommended mom consider pumping once per day to help her promote a robust milk supply and to avoid going more than 4 hours without breast emptying this first month.     Mom will follow up with her pediatrician as directed. Encouraged to call the lactation clinic  for ongoing support if needed, but no follow up was scheduled at this time.

## 2024-11-19 NOTE — PATIENT INSTRUCTIONS
[facility]  Kiara Marissa RN, BSN, IBCLC  [phone]      Birth Weight: 6lbs 13oz  Today's Naked Weight: 7lbs 3.5oz        Eryss transferred 60ml from breast today (32ml right, 28ml left). A summary of topics we discussed today is below the feeding plan developed today.     FEEDING PLAN    Breastfeeding frequency: At least 8 times a day. Aim to feed her at least every 2-3 hours during the day. Make the best of 20 minutes (+/-) when feeding at breast, apply breast compressions and provide gentle stimulation as needed to encourage efficiency at breast.    Pumping: Add 1 or 2 pumpings a day to help promote a more robust milk supply. Consider pumping without 30 minutes of your first feeding of the day. You should note an increase in your milk supply about 72 hours after adding a pump session  to your routine. Adjust pump settings: increase vacuum/suction to maximum level that is comfortably tolerated ~ adjust stimulation mode/expression mode according to milk release.     Follow up: With Pediatrician as directed. With lactation as needed. Refer to additional support groups/resources below.          ADDITIONAL INFORMATION:     Snuggle your baby in skin to skin contact between and during feedings whenever possible.    Massage your breasts before nursing or pumping to soften areola if needed.    Breastfeed with hunger cues: Most babies will breastfeed 8-12 times every 24 hours with some clustered breastfeeding, especially during growth spurts.     Positioning:   Baby facing mom with mouth at nipple level. Bring infant to the breast not the breast to the infant.  Make sure infant is fully turned towards you with head aligned with the shoulders for latch and arms hugging you.  Baby should approach breast reaching up with the chin lifted.  Chin is deep into the breast and nose is slightly away from breast after latch.  Make small adjustments in position for your comfort and to help make space  for the infant's nose if needed.    Deep Latching on:  Express drops of milk onto your baby’s lips to encourage latching if needed.  Aim your nipple to baby’s nose  Wait for a wide mouth and push your nipple in the mouth as you bring infant fully onto the breast.  Observe for mouth \"planted\" on the breast and for good jaw movement with suck.    Is baby taking enough breast milk?  Swallowing with most sucks (every 1-3 sucks) until satisfied at least 8-12 times every 24 hours.  Compressing the breast when your baby sucks can increase milk flow.  At least 6-8 wet diapers and at least 3-4 soft, yellow seedy stools every 24 hours. Use the breastfeeding journal to keep a record.   Weight gain of at least 5-7 ounces per week for the first 3 months after return to birth weight.    Hour of Age  Intake (mL/feed)  1st 24 hours 2-10  24-48 hours 5-15  48-72 hours 15-30  72-96 hours 30-60  Day 10: 2-3 ounces per feeding.  4 weeks: 3-4 ounces or more per feeding.        [facility] has great support for our families even after discharge.  We have virtual or in-person support groups.  Visit our website for the most up-to-date info for our many different support groups. https://www.health.org/services/pregnancy-baby/resources/       New Moms Support Groups  Our weekly New Mom Support Groups are for any new parents in our community. They are led by an experienced Mother/Baby nurse or IBCLC and usually include a guest speaker on a topic of interest to new parents. These in-person groups also include Breastfeeding Support at each meeting. Bring your baby ( - 6 months) with you! Moms-to-be are also welcome! All mom's welcome even if its not your first.     MOM & BABY HOUR   Meets most  10:00 - 11:30 a.m.  Masks are not required, but be considerate of others and do not attend if mom or baby have had any symptoms of illness within the previous 24 hours. Breastfeeding support will be included at each session--just  ask the leader any breastfeeding questions you may have. Location [facility] [place] [address] [place] Go inside the front door and to the right to the “Community Education Room”.    Mom's Line: [phone]  A phone line dedicated for women (or anyone worried about a women) who may be experiencing signs or symptoms of postpartum depression, anxiety, or overwhelmed with new baby. Call - answered by live trained mental health professionals, free and confidential, emotional support, referrals, in any language.    Nurturing Mom- A support group for new and expectant moms looking for support with the transition to parenthood as well as those experiencing symptoms of  anxiety and/or depression.  Please contact [email or web address] if you need directions or the link for the virtual meetings. Please contact [email or web address] if you plan to attend, but please be considerate of others and do not attend if mom or baby have had any symptoms of illness within the previous 24 hours.     La Leche League for breast feeding and parent support, Website: IIIus.org  and for the [place] group and other groups visit https://www.SnapLayout.com/pg/Kate/events/.  to help find a group, all meetings are virtual.     Facebook groups-  for more support when home- [facility] & Mom[facility]ies [facility] --- you can find mom-to-mom advice and the list of speaker topics for cradle talk program.     Helpful websites:    www.llli.org  www.NXTM  www.Breastfeed[place].org              Increasing Milk Production Using a Breast Pump       Kangaroo mother care: Snuggle with your baby in skin to skin contact.  This helps to wake a sleepy baby and increases your milk supply.     Massage your breasts before nursing or pumping.  Practice relaxation techniques like visual imagery.    Increase the frequency of feedings and/or pumping sessions.    Most babies will feed 8-12 times in 24 hours  with some periods of cluster feeding, therefore try to increase pumpings to 8-10 times every 24 hours.    Keep pumping log with 24 hour collection totals to monitor milk supply.  Once your milk is in (3-5 days post-delivery), pump until the sprays of milk slow to drops and for 1-2 minutes after to obtain the high fat milk.  This for most moms is 10-12 minutes, but pumping times may vary slightly.  A short pumping is better than no pumping!  If you have time you can “cluster pump” like a baby cluster feeds at times - pump for ten minutes, rest for ten minutes, then repeat 2-3 times. Or try pumping every hour for 10 minutes for 2-3 hours.     Pumping should not be painful.   Use nipple cream on the base of your nipples prior to pumping.  Place breast flange on your breasts, centering your nipples.    Use correct size breasts flange for your nipple size. Nipple should not rub on inside of breast flange. If you need a different size breast flange contact your nurse or the lactation department.   Set pressure gauge to minimum and turn switch on.  Increase the suction to the most suction that is comfortable for you. Remember pumping should never be painful.  If breast milk flow is minimal, try increasing pressure gauge if it is comfortable to do so.  NOTE: Initially, in the colostrum phase amounts vary from a few drops to 30cc (1oz.).  If pumping is painful, turn the pump off, reposition breast shields, check that the size is correct for your nipple, and adjust the suction. If nipple pain continues contact the lactation department or your doctor.    Ways to help your milk let down (flow) to the pump:   Massage your breasts for a few minutes prior to pumping and massage again if the milk flow slows down during the pumping session.   Hand expression of milk before and after pumping may allow you to obtain more milk than the pump alone.   When milk flow slows, increasing pump speed back to 80 cpm (Ameda [employer]) or  switching pump back to “stimulation” phase to stimulate further milk ejection reflexes. Then decrease speed once milk begins to flow again.   Pump after you’ve seen, held, or touched your baby. Discuss “kangaroo care” with your baby’s nurse - holding your baby skin-to-skin on your chest when your baby is able.  Pump with baby close by or have a picture of your baby to look at while you pump  Inhale the scent of your baby from something your baby wore.  Sit back, close your eyes and imagine how sweet and soft your baby is; imagine “flowing things” like waterfalls, white rivers.  Listen to relaxing music. There are relaxation/meditation CD/tapes made especially for mothers pumping their breast milk.  Have a nice tall glass of water, juice, or milk close by to quench your thirst.  View the [facility] website videos: Maximizing Milk Production and Hand Expression   http://[email or web address].edu/Breastfeeding/MaxProduction.html (Google search: Madhav maximizing milk supply)

## 2024-11-23 ENCOUNTER — TELEPHONE (OUTPATIENT)
Dept: OBGYN UNIT | Facility: HOSPITAL | Age: 39
End: 2024-11-23

## 2025-01-14 ENCOUNTER — APPOINTMENT (OUTPATIENT)
Dept: GENERAL RADIOLOGY | Facility: HOSPITAL | Age: 40
End: 2025-01-14
Attending: EMERGENCY MEDICINE
Payer: COMMERCIAL

## 2025-01-14 ENCOUNTER — HOSPITAL ENCOUNTER (EMERGENCY)
Facility: HOSPITAL | Age: 40
Discharge: HOME | End: 2025-01-14
Attending: EMERGENCY MEDICINE
Payer: COMMERCIAL

## 2025-01-14 VITALS
BODY MASS INDEX: 45 KG/M2 | OXYGEN SATURATION: 100 % | HEART RATE: 78 BPM | SYSTOLIC BLOOD PRESSURE: 161 MMHG | TEMPERATURE: 97 F | RESPIRATION RATE: 19 BRPM | DIASTOLIC BLOOD PRESSURE: 99 MMHG | WEIGHT: 279 LBS

## 2025-01-14 DIAGNOSIS — R07.9 ACUTE CHEST PAIN: Primary | ICD-10-CM

## 2025-01-14 LAB
ALBUMIN SERPL-MCNC: 4.5 G/DL (ref 3.2–4.8)
ALBUMIN/GLOB SERPL: 1.4 {RATIO} (ref 1–2)
ALP LIVER SERPL-CCNC: 118 U/L
ALT SERPL-CCNC: 51 U/L
ANION GAP SERPL CALC-SCNC: 6 MMOL/L (ref 0–18)
AST SERPL-CCNC: 36 U/L (ref ?–34)
ATRIAL RATE: 84 BPM
BASOPHILS # BLD AUTO: 0.04 X10(3) UL (ref 0–0.2)
BASOPHILS NFR BLD AUTO: 0.5 %
BILIRUB SERPL-MCNC: 0.6 MG/DL (ref 0.3–1.2)
BUN BLD-MCNC: 11 MG/DL (ref 9–23)
CALCIUM BLD-MCNC: 10.3 MG/DL (ref 8.7–10.4)
CHLORIDE SERPL-SCNC: 108 MMOL/L (ref 98–112)
CO2 SERPL-SCNC: 25 MMOL/L (ref 21–32)
CREAT BLD-MCNC: 0.84 MG/DL
EGFRCR SERPLBLD CKD-EPI 2021: 91 ML/MIN/1.73M2 (ref 60–?)
EOSINOPHIL # BLD AUTO: 0.09 X10(3) UL (ref 0–0.7)
EOSINOPHIL NFR BLD AUTO: 1 %
ERYTHROCYTE [DISTWIDTH] IN BLOOD BY AUTOMATED COUNT: 15.6 %
FLUAV + FLUBV RNA SPEC NAA+PROBE: NEGATIVE
FLUAV + FLUBV RNA SPEC NAA+PROBE: NEGATIVE
GLOBULIN PLAS-MCNC: 3.3 G/DL (ref 2–3.5)
GLUCOSE BLD-MCNC: 107 MG/DL (ref 70–99)
HCT VFR BLD AUTO: 41.4 %
HGB BLD-MCNC: 13.9 G/DL
IMM GRANULOCYTES # BLD AUTO: 0.03 X10(3) UL (ref 0–1)
IMM GRANULOCYTES NFR BLD: 0.3 %
LYMPHOCYTES # BLD AUTO: 2.75 X10(3) UL (ref 1–4)
LYMPHOCYTES NFR BLD AUTO: 32 %
MCH RBC QN AUTO: 25.4 PG (ref 26–34)
MCHC RBC AUTO-ENTMCNC: 33.6 G/DL (ref 31–37)
MCV RBC AUTO: 75.5 FL
MONOCYTES # BLD AUTO: 0.96 X10(3) UL (ref 0.1–1)
MONOCYTES NFR BLD AUTO: 11.2 %
NEUTROPHILS # BLD AUTO: 4.73 X10 (3) UL (ref 1.5–7.7)
NEUTROPHILS # BLD AUTO: 4.73 X10(3) UL (ref 1.5–7.7)
NEUTROPHILS NFR BLD AUTO: 55 %
OSMOLALITY SERPL CALC.SUM OF ELEC: 288 MOSM/KG (ref 275–295)
P AXIS: 65 DEGREES
P-R INTERVAL: 146 MS
PLATELET # BLD AUTO: 277 10(3)UL (ref 150–450)
POTASSIUM SERPL-SCNC: 3.6 MMOL/L (ref 3.5–5.1)
PROT SERPL-MCNC: 7.8 G/DL (ref 5.7–8.2)
Q-T INTERVAL: 376 MS
QRS DURATION: 84 MS
QTC CALCULATION (BEZET): 444 MS
R AXIS: 20 DEGREES
RBC # BLD AUTO: 5.48 X10(6)UL
RSV RNA SPEC NAA+PROBE: NEGATIVE
SARS-COV-2 RNA RESP QL NAA+PROBE: NOT DETECTED
SODIUM SERPL-SCNC: 139 MMOL/L (ref 136–145)
T AXIS: 5 DEGREES
TROPONIN I SERPL HS-MCNC: <3 NG/L
VENTRICULAR RATE: 84 BPM
WBC # BLD AUTO: 8.6 X10(3) UL (ref 4–11)

## 2025-01-14 PROCEDURE — 99285 EMERGENCY DEPT VISIT HI MDM: CPT

## 2025-01-14 PROCEDURE — 71046 X-RAY EXAM CHEST 2 VIEWS: CPT | Performed by: EMERGENCY MEDICINE

## 2025-01-14 PROCEDURE — 99284 EMERGENCY DEPT VISIT MOD MDM: CPT

## 2025-01-14 PROCEDURE — 84484 ASSAY OF TROPONIN QUANT: CPT

## 2025-01-14 PROCEDURE — 0241U SARS-COV-2/FLU A AND B/RSV BY PCR (GENEXPERT): CPT | Performed by: EMERGENCY MEDICINE

## 2025-01-14 PROCEDURE — 80053 COMPREHEN METABOLIC PANEL: CPT | Performed by: EMERGENCY MEDICINE

## 2025-01-14 PROCEDURE — 36415 COLL VENOUS BLD VENIPUNCTURE: CPT

## 2025-01-14 PROCEDURE — 85025 COMPLETE CBC W/AUTO DIFF WBC: CPT | Performed by: EMERGENCY MEDICINE

## 2025-01-14 PROCEDURE — 80053 COMPREHEN METABOLIC PANEL: CPT

## 2025-01-14 PROCEDURE — 85025 COMPLETE CBC W/AUTO DIFF WBC: CPT

## 2025-01-14 PROCEDURE — 93010 ELECTROCARDIOGRAM REPORT: CPT

## 2025-01-14 PROCEDURE — 84484 ASSAY OF TROPONIN QUANT: CPT | Performed by: EMERGENCY MEDICINE

## 2025-01-14 PROCEDURE — 93005 ELECTROCARDIOGRAM TRACING: CPT

## 2025-01-14 NOTE — ED INITIAL ASSESSMENT (HPI)
39YF c/c of chest pain Pt state that she been having sternal chest pain since this morning while breast feeding Pt state that her pain is worse with palpation

## 2025-01-14 NOTE — ED PROVIDER NOTES
Patient Seen in: [facility] Emergency Department      History     Chief Complaint   Patient presents with    Chest Pain Angina     Stated Complaint: chest pain that began this morning    Subjective:   HPI      39-year-old female presenting with chest pain.  Patient has midsternal chest discomfort she noticed while she was breast-feeding she denies any sort of cough cold fevers chills or any other exacerbating relieving factors she says touching this area does make it worse she denies any rash or breast pain.  She denies any other exacerbating feelings.    Objective:     Past Medical History:    AMA (advanced maternal age) multigravida 35+ (HCC)    Essential hypertension    High blood pressure    Obesity    Obesity, unspecified    Submucous leiomyoma of uterus    Thalassemia trait, beta              Past Surgical History:   Procedure Laterality Date          x 3                 Social History     Socioeconomic History    Marital status:    Occupational History    Occupation:     Tobacco Use    Smoking status: Never    Smokeless tobacco: Never   Vaping Use    Vaping status: Never Used   Substance and Sexual Activity    Alcohol use: Yes     Alcohol/week: 0.0 standard drinks of alcohol     Comment: rare     Drug use: No   Social History Narrative     with 3 boys      Social Drivers of Health     Financial Resource Strain: Low Risk  (2024)    Financial Resource Strain     Difficulty of Paying Living Expenses: Not hard at all     Med Affordability: No   Food Insecurity: No Food Insecurity (2024)    Food Insecurity     Food Insecurity: Never true   Transportation Needs: No Transportation Needs (2024)    Transportation Needs     Lack of Transportation: No     Car Seat: Yes   Stress: No Stress Concern Present (2024)    Stress     Feeling of Stress : No   Housing Stability: Low Risk  (2024)    Housing Stability     Housing Instability: No     Crib or Bassinette: Yes                   Physical Exam     ED Triage Vitals   BP 01/14/25 0048 (!) 153/101   Pulse 01/14/25 0048 75   Resp 01/14/25 0048 20   Temp 01/14/25 0048 97.4 °F (36.3 °C)   Temp src 01/14/25 0048 Temporal   SpO2 01/14/25 0048 94 %   O2 Device 01/14/25 0315 None (Room air)       Current Vitals:   Vital Signs  BP: (!) 161/99  Pulse: 78  Resp: 19  Temp: 97.4 °F (36.3 °C)  Temp src: Temporal  MAP (mmHg): (!) 116    Oxygen Therapy  SpO2: 100 %  O2 Device: None (Room air)        Physical Exam  Awake alert patient appears no distress HEENT exam is normal lungs are clear cardiovascular exam shows a regular rhythm abdomen soft nontender extremities no COVID cyanosis or edema no rash back exam is normal skin is nondiaphoretic    ED Course     Labs Reviewed   CBC WITH DIFFERENTIAL WITH PLATELET - Abnormal; Notable for the following components:       Result Value    RBC 5.48 (*)     MCV 75.5 (*)     MCH 25.4 (*)     All other components within normal limits   COMP METABOLIC PANEL (14) - Abnormal; Notable for the following components:    Glucose 107 (*)     AST 36 (*)     ALT 51 (*)     Alkaline Phosphatase 118 (*)     All other components within normal limits   TROPONIN I HIGH SENSITIVITY - Normal   RAINBOW DRAW BLUE   SARS-COV-2/FLU A AND B/RSV BY PCR (GENEXPERT)     EKG    Rate, intervals and axes as noted on EKG Report.  Rate: 84  Rhythm: Sinus Rhythm  Reading: No areas of acute ST segment elevation or depression                Differential diagnosis includes acute coronary syndrome, pneumonia       MDM              Medical Decision Making  39-year-old female presenting with chest pain IV established cardiac monitor shows a sinus rhythm pulse ox shows no signs of hypoxia CBC shows a stable hemoglobin level metabolic panel shows a stable renal function troponin shows elevation to event acute coronary syndrome or NSTEMI the patient's independent interpretation by ED physician of chest x-ray shows no pneumothorax small left  pleural effusion patient pain seems to be reproducible to palpation emerged part believe is more costochondritis patient will be discharged home with symptomatic care instructions and is to return to the emergency department for worsening symptoms other complaints.  The patient was screened and evaluated during this visit.  As a treating physician attending to the patient, I determined, within reasonable clinical confidence and prior to discharge, that an emergency medical condition was not or was no longer present.  There was no indication for further evaluation, treatment or admission on an emergency basis.    The usual and customary discharge instructions were discussed given the patient's ER course.  We discussed signs and symptoms that should prompt the patient's immediate return to the emergency department.  Reasonable over-the-counter and prescription treatment options and physician follow-up plan was discussed.  Patient was discharged home in good condition  This note was prepared using Dragon Medical voice recognition dictation software.  As a result errors may occur.  When identified to these areas have been corrected.  While every attempt is made to correct errors during dictation discrepancies may still exist.  Please contact if there are any errors    Problems Addressed:  Acute chest pain: acute illness or injury    Amount and/or Complexity of Data Reviewed  Labs: ordered. Decision-making details documented in ED Course.  Radiology: ordered and independent interpretation performed. Decision-making details documented in ED Course.  ECG/medicine tests: ordered and independent interpretation performed. Decision-making details documented in ED Course.        Disposition and Plan     Clinical Impression:  1. Acute chest pain         Disposition:  Discharge  1/14/2025  3:26 am    Follow-up:  Isi Roberto MD  [address] [place] [address]  [phone]    Follow up in 1  week(s)            Medications Prescribed:  Current Discharge Medication List              Supplementary Documentation:

## 2025-02-11 ENCOUNTER — TELEPHONE (OUTPATIENT)
Dept: CASE MANAGEMENT | Facility: HOSPITAL | Age: 40
End: 2025-02-11

## 2025-02-11 NOTE — CM/SW NOTE
Caller asking for imaging on disks for continuity of care for Dr. Tiffanie Platt.  Caller states he is calling from [facility].  No information given.  Call forwareded to Medical Records.

## (undated) NOTE — LETTER
[facility]  Administration of Anesthesia  Lolis CHAPMAN agree to be cared for by a physician anesthesiologist alone and/or with a nurse anesthetist, who is specially trained to monitor me and give me medicine to put me to sleep or keep me comfortable during my procedure    I understand that my anesthesiologist and/or anesthetist is not an employee or agent of [facility] or [facility] [employer]. He or she works for [facility]    As the patient asking for anesthesia services, I agree to:  Allow the anesthesiologist (anesthesia doctor) to give me medicine and do additional procedures as necessary. Some examples are: Starting or using an “IV” to give me medicine, fluids or blood during my procedure, and having a breathing tube placed to help me breathe when I’m asleep (intubation). In the event that my heart stops working properly, I understand that my anesthesiologist will make every effort to sustain my life, unless otherwise directed by [facility] Do Not Resuscitate documents.  Tell my anesthesia doctor before my procedure:  If I am pregnant.  The last time that I ate or drank.  iii. All of the medicines I take (including prescriptions, herbal supplements, and pills I can buy without a prescription (including street drugs/illegal medications). Failure to inform my anesthesiologist about these medicines may increase my risk of anesthetic complications.  iv.If I am allergic to anything or have had a reaction to anesthesia before.  I understand how the anesthesia medicine will help me (benefits).  I understand that with any type of anesthesia medicine there are risks:  The most common risks are: nausea, vomiting, sore throat, muscle soreness, damage to my eyes, mouth, or teeth (from breathing tube placement).  Rare risks include: remembering what happened during my procedure, allergic reactions to medications, injury to my airway, heart, lungs, vision, nerves, or  muscles and in extremely rare instances death.  My doctor has explained to me other choices available to me for my care (alternatives).  Pregnant Patients (“epidural”):  I understand that the risks of having an epidural (medicine given into my back to help control pain during labor), include itching, low blood pressure, difficulty urinating, headache or slowing of the baby’s heart. Very rare risks include infection, bleeding, seizure, irregular heart rhythms and nerve injury.  Regional Anesthesia (“spinal”, “epidural”, & “nerve blocks”):  I understand that rare but potential complications include headache, bleeding, infection, seizure, irregular heart rhythms, and nerve injury.    _____________________________________________________________________________  Patient (or Representative) Signature/Relationship to Patient  Date   Time    _____________________________________________________________________________   Name (if used)    Language/Organization   Time    _____________________________________________________________________________  Nurse Anesthetist Signature     Date   Time  _____________________________________________________________________________  Anesthesiologist Signature     Date   Time  I have discussed the procedure and information above with the patient (or patient’s representative) and answered their questions. The patient or their representative has agreed to have anesthesia services.    _____________________________________________________________________________  Witness        Date   Time  I have verified that the signature is that of the patient or patient’s representative, and that it was signed before the procedure  Patient Name: Lolis Shaw     : 1985                 Printed: 2024 at 5:17 AM    Medical Record #: [id number]                                            Page 1 of 1  ----------ANESTHESIA CONSENT----------